# Patient Record
Sex: FEMALE | Employment: FULL TIME | URBAN - METROPOLITAN AREA
[De-identification: names, ages, dates, MRNs, and addresses within clinical notes are randomized per-mention and may not be internally consistent; named-entity substitution may affect disease eponyms.]

---

## 2023-12-04 ENCOUNTER — TELEPHONE (OUTPATIENT)
Dept: PRIMARY CARE | Facility: CLINIC | Age: 53
End: 2023-12-04

## 2023-12-04 ENCOUNTER — PHARMACY VISIT (OUTPATIENT)
Dept: PHARMACY | Facility: CLINIC | Age: 53
End: 2023-12-04
Payer: COMMERCIAL

## 2023-12-04 ENCOUNTER — TELEPHONE (OUTPATIENT)
Dept: PRIMARY CARE | Facility: CLINIC | Age: 53
End: 2023-12-04
Payer: COMMERCIAL

## 2023-12-04 DIAGNOSIS — N30.00 ACUTE CYSTITIS WITHOUT HEMATURIA: Primary | ICD-10-CM

## 2023-12-04 PROCEDURE — RXMED WILLOW AMBULATORY MEDICATION CHARGE

## 2023-12-04 RX ORDER — SULFAMETHOXAZOLE AND TRIMETHOPRIM 800; 160 MG/1; MG/1
1 TABLET ORAL 2 TIMES DAILY
Qty: 28 TABLET | Refills: 0 | Status: SHIPPED | OUTPATIENT
Start: 2023-12-04 | End: 2023-12-18

## 2024-01-12 ENCOUNTER — PHARMACY VISIT (OUTPATIENT)
Dept: PHARMACY | Facility: CLINIC | Age: 54
End: 2024-01-12
Payer: COMMERCIAL

## 2024-01-12 ENCOUNTER — TELEPHONE (OUTPATIENT)
Dept: PRIMARY CARE | Facility: CLINIC | Age: 54
End: 2024-01-12

## 2024-01-12 DIAGNOSIS — J40 BRONCHITIS: Primary | ICD-10-CM

## 2024-01-12 PROCEDURE — RXMED WILLOW AMBULATORY MEDICATION CHARGE

## 2024-01-12 PROCEDURE — RXOTC WILLOW AMBULATORY OTC CHARGE

## 2024-01-12 RX ORDER — AZITHROMYCIN 250 MG/1
TABLET, FILM COATED ORAL
Qty: 6 TABLET | Refills: 0 | Status: SHIPPED | OUTPATIENT
Start: 2024-01-12 | End: 2024-01-17

## 2024-02-19 ENCOUNTER — PHARMACY VISIT (OUTPATIENT)
Dept: PHARMACY | Facility: CLINIC | Age: 54
End: 2024-02-19
Payer: COMMERCIAL

## 2024-02-19 ENCOUNTER — TELEPHONE (OUTPATIENT)
Dept: PRIMARY CARE | Facility: CLINIC | Age: 54
End: 2024-02-19

## 2024-02-19 DIAGNOSIS — J40 BRONCHITIS: Primary | ICD-10-CM

## 2024-02-19 PROCEDURE — RXOTC WILLOW AMBULATORY OTC CHARGE

## 2024-02-19 PROCEDURE — RXMED WILLOW AMBULATORY MEDICATION CHARGE

## 2024-02-19 RX ORDER — AZITHROMYCIN 250 MG/1
TABLET, FILM COATED ORAL
Qty: 6 TABLET | Refills: 0 | Status: SHIPPED | OUTPATIENT
Start: 2024-02-19 | End: 2024-02-24

## 2024-02-27 ENCOUNTER — TELEPHONE (OUTPATIENT)
Dept: PRIMARY CARE | Facility: CLINIC | Age: 54
End: 2024-02-27
Payer: COMMERCIAL

## 2024-02-27 DIAGNOSIS — Z12.31 ENCOUNTER FOR SCREENING MAMMOGRAM FOR MALIGNANT NEOPLASM OF BREAST: ICD-10-CM

## 2024-03-06 ENCOUNTER — HOSPITAL ENCOUNTER (OUTPATIENT)
Dept: RADIOLOGY | Facility: HOSPITAL | Age: 54
Discharge: HOME | End: 2024-03-06
Payer: COMMERCIAL

## 2024-03-06 VITALS — BODY MASS INDEX: 21.66 KG/M2 | HEIGHT: 67 IN | WEIGHT: 138 LBS

## 2024-03-06 DIAGNOSIS — Z12.31 ENCOUNTER FOR SCREENING MAMMOGRAM FOR MALIGNANT NEOPLASM OF BREAST: ICD-10-CM

## 2024-03-06 PROCEDURE — 77063 BREAST TOMOSYNTHESIS BI: CPT | Mod: BILATERAL PROCEDURE | Performed by: RADIOLOGY

## 2024-03-06 PROCEDURE — 77067 SCR MAMMO BI INCL CAD: CPT

## 2024-03-06 PROCEDURE — 77067 SCR MAMMO BI INCL CAD: CPT | Mod: BILATERAL PROCEDURE | Performed by: RADIOLOGY

## 2024-03-18 ENCOUNTER — HOSPITAL ENCOUNTER (OUTPATIENT)
Dept: RADIOLOGY | Facility: HOSPITAL | Age: 54
Discharge: HOME | End: 2024-03-18
Payer: COMMERCIAL

## 2024-03-18 DIAGNOSIS — R92.8 OTHER ABNORMAL AND INCONCLUSIVE FINDINGS ON DIAGNOSTIC IMAGING OF BREAST: ICD-10-CM

## 2024-03-18 PROCEDURE — 76642 ULTRASOUND BREAST LIMITED: CPT | Mod: LT

## 2024-03-18 PROCEDURE — 76982 USE 1ST TARGET LESION: CPT | Mod: LT

## 2024-03-18 PROCEDURE — 77061 BREAST TOMOSYNTHESIS UNI: CPT | Mod: LT

## 2024-03-21 DIAGNOSIS — B00.1 RECURRENT COLD SORES: ICD-10-CM

## 2024-03-21 RX ORDER — VALACYCLOVIR HYDROCHLORIDE 1 G/1
1000 TABLET, FILM COATED ORAL EVERY 24 HOURS
COMMUNITY
Start: 2017-12-04 | End: 2024-03-21 | Stop reason: SDUPTHER

## 2024-03-22 ENCOUNTER — PHARMACY VISIT (OUTPATIENT)
Dept: PHARMACY | Facility: CLINIC | Age: 54
End: 2024-03-22
Payer: COMMERCIAL

## 2024-03-22 PROCEDURE — RXMED WILLOW AMBULATORY MEDICATION CHARGE

## 2024-03-22 RX ORDER — VALACYCLOVIR HYDROCHLORIDE 1 G/1
1000 TABLET, FILM COATED ORAL EVERY 24 HOURS
Qty: 90 TABLET | Refills: 2 | Status: SHIPPED | OUTPATIENT
Start: 2024-03-22

## 2024-04-12 ENCOUNTER — CLINICAL SUPPORT (OUTPATIENT)
Dept: PRIMARY CARE | Facility: CLINIC | Age: 54
End: 2024-04-12
Payer: COMMERCIAL

## 2024-04-12 DIAGNOSIS — Z23 ENCOUNTER FOR IMMUNIZATION: ICD-10-CM

## 2024-04-12 PROCEDURE — 90750 HZV VACC RECOMBINANT IM: CPT | Performed by: FAMILY MEDICINE

## 2024-05-07 ENCOUNTER — PHARMACY VISIT (OUTPATIENT)
Dept: PHARMACY | Facility: CLINIC | Age: 54
End: 2024-05-07
Payer: COMMERCIAL

## 2024-05-07 DIAGNOSIS — B36.9 FUNGAL INFECTION OF SKIN: ICD-10-CM

## 2024-05-07 PROCEDURE — RXMED WILLOW AMBULATORY MEDICATION CHARGE

## 2024-05-07 RX ORDER — KETOCONAZOLE 20 MG/G
CREAM TOPICAL 2 TIMES DAILY
Qty: 60 G | Refills: 1 | Status: SHIPPED | OUTPATIENT
Start: 2024-05-07

## 2024-05-07 RX ORDER — KETOCONAZOLE 20 MG/G
CREAM TOPICAL DAILY
COMMUNITY
End: 2024-05-07 | Stop reason: SDUPTHER

## 2024-05-13 ENCOUNTER — PHARMACY VISIT (OUTPATIENT)
Dept: PHARMACY | Facility: CLINIC | Age: 54
End: 2024-05-13
Payer: COMMERCIAL

## 2024-05-13 DIAGNOSIS — B35.4 TINEA CORPORIS: ICD-10-CM

## 2024-05-13 PROCEDURE — RXMED WILLOW AMBULATORY MEDICATION CHARGE

## 2024-05-13 RX ORDER — FLUCONAZOLE 150 MG/1
150 TABLET ORAL DAILY
COMMUNITY
Start: 2024-05-13 | End: 2024-05-13 | Stop reason: SDUPTHER

## 2024-05-13 RX ORDER — FLUCONAZOLE 150 MG/1
150 TABLET ORAL DAILY
Qty: 7 TABLET | Refills: 0 | Status: SHIPPED | OUTPATIENT
Start: 2024-05-13 | End: 2024-05-20

## 2024-05-17 ENCOUNTER — PHARMACY VISIT (OUTPATIENT)
Dept: PHARMACY | Facility: CLINIC | Age: 54
End: 2024-05-17
Payer: COMMERCIAL

## 2024-05-17 PROCEDURE — RXMED WILLOW AMBULATORY MEDICATION CHARGE

## 2024-05-17 RX ORDER — TRIAMCINOLONE ACETONIDE 1 MG/G
OINTMENT TOPICAL
Qty: 80 G | Refills: 1 | OUTPATIENT
Start: 2024-05-17

## 2024-07-19 ENCOUNTER — TELEPHONE (OUTPATIENT)
Dept: PRIMARY CARE | Facility: CLINIC | Age: 54
End: 2024-07-19
Payer: COMMERCIAL

## 2024-07-19 ENCOUNTER — PHARMACY VISIT (OUTPATIENT)
Dept: PHARMACY | Facility: CLINIC | Age: 54
End: 2024-07-19
Payer: COMMERCIAL

## 2024-07-19 DIAGNOSIS — N30.00 ACUTE CYSTITIS WITHOUT HEMATURIA: Primary | ICD-10-CM

## 2024-07-19 PROCEDURE — RXMED WILLOW AMBULATORY MEDICATION CHARGE

## 2024-07-19 RX ORDER — SULFAMETHOXAZOLE AND TRIMETHOPRIM 800; 160 MG/1; MG/1
1 TABLET ORAL 2 TIMES DAILY
Qty: 28 TABLET | Refills: 0 | Status: SHIPPED | OUTPATIENT
Start: 2024-07-19 | End: 2024-08-02

## 2024-08-09 ENCOUNTER — PHARMACY VISIT (OUTPATIENT)
Dept: PHARMACY | Facility: CLINIC | Age: 54
End: 2024-08-09
Payer: COMMERCIAL

## 2024-08-09 DIAGNOSIS — M70.51 SUPRAPATELLAR BURSITIS OF RIGHT KNEE: Primary | ICD-10-CM

## 2024-08-09 PROCEDURE — RXMED WILLOW AMBULATORY MEDICATION CHARGE

## 2024-08-09 RX ORDER — METHYLPREDNISOLONE 4 MG/1
TABLET ORAL
Qty: 21 TABLET | Refills: 0 | Status: SHIPPED | OUTPATIENT
Start: 2024-08-09

## 2024-08-28 ENCOUNTER — CLINICAL SUPPORT (OUTPATIENT)
Dept: PRIMARY CARE | Facility: CLINIC | Age: 54
End: 2024-08-28
Payer: COMMERCIAL

## 2024-08-28 DIAGNOSIS — Z00.00 ROUTINE MEDICAL EXAM: ICD-10-CM

## 2024-08-28 DIAGNOSIS — I95.89 OTHER SPECIFIED HYPOTENSION: ICD-10-CM

## 2024-08-28 DIAGNOSIS — R42 DIZZY: ICD-10-CM

## 2024-08-28 LAB
ALBUMIN SERPL-MCNC: 4.7 G/DL (ref 3.5–5)
ALP BLD-CCNC: 99 U/L (ref 35–125)
ALT SERPL-CCNC: 39 U/L (ref 5–40)
ANION GAP SERPL CALC-SCNC: 12 MMOL/L
AST SERPL-CCNC: 41 U/L (ref 5–40)
BASOPHILS # BLD AUTO: 0.03 X10*3/UL (ref 0–0.1)
BASOPHILS NFR BLD AUTO: 0.5 %
BILIRUB SERPL-MCNC: 0.8 MG/DL (ref 0.1–1.2)
BUN SERPL-MCNC: 5 MG/DL (ref 8–25)
CALCIUM SERPL-MCNC: 9.5 MG/DL (ref 8.5–10.4)
CHLORIDE SERPL-SCNC: 105 MMOL/L (ref 97–107)
CHOLEST SERPL-MCNC: 195 MG/DL (ref 133–200)
CHOLEST/HDLC SERPL: 1.9 {RATIO}
CO2 SERPL-SCNC: 26 MMOL/L (ref 24–31)
CREAT SERPL-MCNC: 0.5 MG/DL (ref 0.4–1.6)
EGFRCR SERPLBLD CKD-EPI 2021: >90 ML/MIN/1.73M*2
EOSINOPHIL # BLD AUTO: 0.24 X10*3/UL (ref 0–0.7)
EOSINOPHIL NFR BLD AUTO: 3.8 %
ERYTHROCYTE [DISTWIDTH] IN BLOOD BY AUTOMATED COUNT: 13.2 % (ref 11.5–14.5)
GLUCOSE SERPL-MCNC: 85 MG/DL (ref 65–99)
HCT VFR BLD AUTO: 43.1 % (ref 36–46)
HDLC SERPL-MCNC: 102 MG/DL
HGB BLD-MCNC: 14.4 G/DL (ref 12–16)
IMM GRANULOCYTES # BLD AUTO: 0.03 X10*3/UL (ref 0–0.7)
IMM GRANULOCYTES NFR BLD AUTO: 0.5 % (ref 0–0.9)
LDLC SERPL CALC-MCNC: 69 MG/DL (ref 65–130)
LYMPHOCYTES # BLD AUTO: 2.46 X10*3/UL (ref 1.2–4.8)
LYMPHOCYTES NFR BLD AUTO: 38.7 %
MAGNESIUM SERPL-MCNC: 2.1 MG/DL (ref 1.6–3.1)
MCH RBC QN AUTO: 32.3 PG (ref 26–34)
MCHC RBC AUTO-ENTMCNC: 33.4 G/DL (ref 32–36)
MCV RBC AUTO: 97 FL (ref 80–100)
MONOCYTES # BLD AUTO: 0.52 X10*3/UL (ref 0.1–1)
MONOCYTES NFR BLD AUTO: 8.2 %
NEUTROPHILS # BLD AUTO: 3.07 X10*3/UL (ref 1.2–7.7)
NEUTROPHILS NFR BLD AUTO: 48.3 %
NRBC BLD-RTO: 0 /100 WBCS (ref 0–0)
PLATELET # BLD AUTO: 282 X10*3/UL (ref 150–450)
POTASSIUM SERPL-SCNC: 4.5 MMOL/L (ref 3.4–5.1)
PROT SERPL-MCNC: 7.2 G/DL (ref 5.9–7.9)
RBC # BLD AUTO: 4.46 X10*6/UL (ref 4–5.2)
SODIUM SERPL-SCNC: 143 MMOL/L (ref 133–145)
TRIGL SERPL-MCNC: 119 MG/DL (ref 40–150)
TSH SERPL DL<=0.05 MIU/L-ACNC: 1.32 MIU/L (ref 0.27–4.2)
WBC # BLD AUTO: 6.4 X10*3/UL (ref 4.4–11.3)

## 2024-08-28 PROCEDURE — 84443 ASSAY THYROID STIM HORMONE: CPT

## 2024-08-28 PROCEDURE — 85025 COMPLETE CBC W/AUTO DIFF WBC: CPT

## 2024-08-28 PROCEDURE — 80053 COMPREHEN METABOLIC PANEL: CPT

## 2024-08-28 PROCEDURE — 83735 ASSAY OF MAGNESIUM: CPT

## 2024-08-28 PROCEDURE — 80061 LIPID PANEL: CPT

## 2024-08-28 PROCEDURE — 36415 COLL VENOUS BLD VENIPUNCTURE: CPT

## 2024-08-30 ENCOUNTER — OFFICE VISIT (OUTPATIENT)
Dept: PRIMARY CARE | Facility: CLINIC | Age: 54
End: 2024-08-30
Payer: COMMERCIAL

## 2024-08-30 VITALS
HEIGHT: 67 IN | DIASTOLIC BLOOD PRESSURE: 60 MMHG | BODY MASS INDEX: 21.35 KG/M2 | RESPIRATION RATE: 20 BRPM | WEIGHT: 136 LBS | HEART RATE: 83 BPM | OXYGEN SATURATION: 100 % | SYSTOLIC BLOOD PRESSURE: 108 MMHG

## 2024-08-30 DIAGNOSIS — Z12.31 ENCOUNTER FOR SCREENING MAMMOGRAM FOR MALIGNANT NEOPLASM OF BREAST: ICD-10-CM

## 2024-08-30 DIAGNOSIS — R42 DIZZY: ICD-10-CM

## 2024-08-30 DIAGNOSIS — Z00.00 ROUTINE MEDICAL EXAM: ICD-10-CM

## 2024-08-30 DIAGNOSIS — I95.89 OTHER SPECIFIED HYPOTENSION: ICD-10-CM

## 2024-08-30 DIAGNOSIS — R92.8 ABNORMAL MAMMOGRAM OF LEFT BREAST: ICD-10-CM

## 2024-08-30 PROCEDURE — 99396 PREV VISIT EST AGE 40-64: CPT | Performed by: NURSE PRACTITIONER

## 2024-08-30 PROCEDURE — 3008F BODY MASS INDEX DOCD: CPT | Performed by: NURSE PRACTITIONER

## 2024-08-30 ASSESSMENT — ENCOUNTER SYMPTOMS
LIGHT-HEADEDNESS: 1
CONSTITUTIONAL NEGATIVE: 1
GASTROINTESTINAL NEGATIVE: 1
MUSCULOSKELETAL NEGATIVE: 1
CARDIOVASCULAR NEGATIVE: 1
RESPIRATORY NEGATIVE: 1
DIZZINESS: 1

## 2024-08-30 ASSESSMENT — PAIN SCALES - GENERAL: PAINLEVEL: 0-NO PAIN

## 2024-08-30 NOTE — PROGRESS NOTES
"Subjective   Patient ID: Jana Galloway is a 54 y.o. female who presents for Annual Exam (Pt had lab work done 2 days ago. ).    HPI Having dizziness and noted low BP, was drinking 3 diet coke daily at work, has been drinking more water.     Review of Systems   Constitutional: Negative.    HENT: Negative.     Respiratory: Negative.     Cardiovascular: Negative.    Gastrointestinal: Negative.    Genitourinary: Negative.    Musculoskeletal: Negative.    Neurological:  Positive for dizziness and light-headedness.       Objective   /60   Pulse 83   Resp 20   Ht 1.702 m (5' 7\")   Wt 61.7 kg (136 lb)   SpO2 100%   BMI 21.30 kg/m²     Physical Exam  Vitals and nursing note reviewed.   Constitutional:       General: She is not in acute distress.  HENT:      Right Ear: Tympanic membrane and ear canal normal.      Left Ear: Tympanic membrane and ear canal normal.      Nose: Nose normal. No rhinorrhea.      Mouth/Throat:      Pharynx: Oropharynx is clear. No oropharyngeal exudate or posterior oropharyngeal erythema.      Comments: Dentition wnl  Eyes:      Extraocular Movements: Extraocular movements intact.      Conjunctiva/sclera: Conjunctivae normal.      Pupils: Pupils are equal, round, and reactive to light.   Neck:      Vascular: No carotid bruit.   Cardiovascular:      Rate and Rhythm: Normal rate and regular rhythm.      Heart sounds: Normal heart sounds. No murmur heard.  Pulmonary:      Breath sounds: Normal breath sounds. No wheezing or rhonchi.   Abdominal:      General: Bowel sounds are normal. There is no distension.      Palpations: Abdomen is soft. There is no mass.      Tenderness: There is no abdominal tenderness. There is no guarding or rebound.      Hernia: No hernia is present.   Musculoskeletal:         General: No swelling or tenderness. Normal range of motion.      Cervical back: Normal range of motion and neck supple.   Lymphadenopathy:      Cervical: No cervical adenopathy.   Skin:    "  General: Skin is warm.      Findings: No rash.   Neurological:      General: No focal deficit present.      Mental Status: She is alert.         Assessment/Plan

## 2024-09-05 ENCOUNTER — TELEPHONE (OUTPATIENT)
Dept: PRIMARY CARE | Facility: CLINIC | Age: 54
End: 2024-09-05
Payer: COMMERCIAL

## 2024-09-05 ENCOUNTER — PHARMACY VISIT (OUTPATIENT)
Dept: PHARMACY | Facility: CLINIC | Age: 54
End: 2024-09-05
Payer: COMMERCIAL

## 2024-09-05 DIAGNOSIS — J01.00 ACUTE NON-RECURRENT MAXILLARY SINUSITIS: Primary | ICD-10-CM

## 2024-09-05 PROCEDURE — RXOTC WILLOW AMBULATORY OTC CHARGE

## 2024-09-05 PROCEDURE — RXMED WILLOW AMBULATORY MEDICATION CHARGE

## 2024-09-05 RX ORDER — AZITHROMYCIN 250 MG/1
TABLET, FILM COATED ORAL
Qty: 6 TABLET | Refills: 0 | Status: SHIPPED | OUTPATIENT
Start: 2024-09-05 | End: 2024-09-10

## 2024-09-30 ENCOUNTER — HOSPITAL ENCOUNTER (OUTPATIENT)
Dept: RADIOLOGY | Facility: CLINIC | Age: 54
Discharge: HOME | End: 2024-09-30
Payer: COMMERCIAL

## 2024-09-30 ENCOUNTER — PHARMACY VISIT (OUTPATIENT)
Dept: PHARMACY | Facility: CLINIC | Age: 54
End: 2024-09-30
Payer: COMMERCIAL

## 2024-09-30 ENCOUNTER — APPOINTMENT (OUTPATIENT)
Dept: PRIMARY CARE | Facility: CLINIC | Age: 54
End: 2024-09-30
Payer: COMMERCIAL

## 2024-09-30 DIAGNOSIS — M54.16 LUMBAR RADICULOPATHY: ICD-10-CM

## 2024-09-30 DIAGNOSIS — M54.16 LUMBAR RADICULOPATHY: Primary | ICD-10-CM

## 2024-09-30 PROCEDURE — 72110 X-RAY EXAM L-2 SPINE 4/>VWS: CPT | Performed by: RADIOLOGY

## 2024-09-30 PROCEDURE — RXMED WILLOW AMBULATORY MEDICATION CHARGE

## 2024-09-30 PROCEDURE — 72110 X-RAY EXAM L-2 SPINE 4/>VWS: CPT

## 2024-09-30 RX ORDER — PREDNISONE 20 MG/1
TABLET ORAL
Qty: 20 TABLET | Refills: 0 | Status: SHIPPED | OUTPATIENT
Start: 2024-09-30 | End: 2024-10-13

## 2024-09-30 RX ORDER — GABAPENTIN 300 MG/1
300 CAPSULE ORAL NIGHTLY
Qty: 30 CAPSULE | Refills: 1 | Status: SHIPPED | OUTPATIENT
Start: 2024-09-30 | End: 2025-03-29

## 2024-10-04 ENCOUNTER — HOSPITAL ENCOUNTER (OUTPATIENT)
Dept: RADIOLOGY | Facility: HOSPITAL | Age: 54
Discharge: HOME | End: 2024-10-04
Payer: COMMERCIAL

## 2024-10-04 VITALS — BODY MASS INDEX: 21.03 KG/M2 | HEIGHT: 67 IN | WEIGHT: 134 LBS

## 2024-10-04 DIAGNOSIS — R92.8 ABNORMAL MAMMOGRAM OF LEFT BREAST: ICD-10-CM

## 2024-10-04 PROCEDURE — 77061 BREAST TOMOSYNTHESIS UNI: CPT | Mod: LT

## 2024-10-04 PROCEDURE — 76642 ULTRASOUND BREAST LIMITED: CPT | Mod: LT

## 2024-10-07 ENCOUNTER — TELEPHONE (OUTPATIENT)
Dept: PRIMARY CARE | Facility: CLINIC | Age: 54
End: 2024-10-07
Payer: COMMERCIAL

## 2024-10-07 DIAGNOSIS — M51.16 LUMBAR DISC DISEASE WITH RADICULOPATHY: Primary | ICD-10-CM

## 2024-10-07 NOTE — PROGRESS NOTES
Subjective   Patient ID: Jana Galloway is a 54 y.o. female who presents for No chief complaint on file..    HPI pt with ongoing severe back pain with radiation down her right leg to her ankle.  No bowel or bladder dysfunction.  Pain keeping her up at night.  She has tried rest/otc meds and 2 rounds of prednisone without relief.  Has tried TENS PT also and had XR lumbar spine.      Review of Systemssee HPI    Objective   There were no vitals taken for this visit.    Physical Exam some right paralumbar pain to palpation L2-5 and LS junction.  FB to 70 degrees.  Positive radicular pain down right posterior leg to ankle.  .  Good rom of hip.      Assessment/Plan   Problem List Items Addressed This Visit    None  Visit Diagnoses         Codes    Lumbar disc disease with radiculopathy    -  Primary M51.16        Set up MR for eval RO disc herniation.

## 2024-10-18 DIAGNOSIS — M54.16 LUMBAR RADICULOPATHY: Primary | ICD-10-CM

## 2024-10-18 PROCEDURE — RXMED WILLOW AMBULATORY MEDICATION CHARGE

## 2024-10-18 RX ORDER — TRAMADOL HYDROCHLORIDE 50 MG/1
50 TABLET ORAL EVERY 6 HOURS PRN
Qty: 28 TABLET | Refills: 0 | Status: SHIPPED | OUTPATIENT
Start: 2024-10-18 | End: 2024-10-26

## 2024-10-19 ENCOUNTER — PHARMACY VISIT (OUTPATIENT)
Dept: PHARMACY | Facility: CLINIC | Age: 54
End: 2024-10-19
Payer: COMMERCIAL

## 2024-10-21 ENCOUNTER — HOSPITAL ENCOUNTER (OUTPATIENT)
Dept: RADIOLOGY | Facility: HOSPITAL | Age: 54
End: 2024-10-21
Payer: COMMERCIAL

## 2024-10-22 DIAGNOSIS — M54.9 BACK PAIN, UNSPECIFIED BACK LOCATION, UNSPECIFIED BACK PAIN LATERALITY, UNSPECIFIED CHRONICITY: ICD-10-CM

## 2024-10-22 RX ORDER — GABAPENTIN 300 MG/1
300 CAPSULE ORAL 2 TIMES DAILY
Qty: 60 CAPSULE | Refills: 1 | Status: SHIPPED | OUTPATIENT
Start: 2024-10-22

## 2024-10-22 RX ORDER — GABAPENTIN 300 MG/1
300 CAPSULE ORAL 2 TIMES DAILY
COMMUNITY
Start: 2024-09-30 | End: 2024-10-22 | Stop reason: SDUPTHER

## 2024-10-23 ENCOUNTER — PHARMACY VISIT (OUTPATIENT)
Dept: PHARMACY | Facility: CLINIC | Age: 54
End: 2024-10-23
Payer: COMMERCIAL

## 2024-10-23 PROCEDURE — RXMED WILLOW AMBULATORY MEDICATION CHARGE

## 2024-10-23 RX ORDER — HYDROCODONE BITARTRATE AND ACETAMINOPHEN 5; 325 MG/1; MG/1
TABLET ORAL
Qty: 20 TABLET | Refills: 0 | OUTPATIENT
Start: 2024-10-23

## 2024-10-24 ENCOUNTER — PHARMACY VISIT (OUTPATIENT)
Dept: PHARMACY | Facility: CLINIC | Age: 54
End: 2024-10-24
Payer: COMMERCIAL

## 2024-10-24 PROCEDURE — RXMED WILLOW AMBULATORY MEDICATION CHARGE

## 2024-10-28 ENCOUNTER — TELEPHONE (OUTPATIENT)
Dept: PRIMARY CARE | Facility: CLINIC | Age: 54
End: 2024-10-28
Payer: COMMERCIAL

## 2024-10-28 ENCOUNTER — PHARMACY VISIT (OUTPATIENT)
Dept: PHARMACY | Facility: CLINIC | Age: 54
End: 2024-10-28
Payer: COMMERCIAL

## 2024-10-28 DIAGNOSIS — L73.9 FOLLICULITIS: Primary | ICD-10-CM

## 2024-10-28 PROCEDURE — RXMED WILLOW AMBULATORY MEDICATION CHARGE

## 2024-10-28 PROCEDURE — RXOTC WILLOW AMBULATORY OTC CHARGE

## 2024-10-28 RX ORDER — SULFAMETHOXAZOLE AND TRIMETHOPRIM 800; 160 MG/1; MG/1
1 TABLET ORAL 2 TIMES DAILY
Qty: 28 TABLET | Refills: 0 | Status: SHIPPED | OUTPATIENT
Start: 2024-10-28 | End: 2024-11-11

## 2024-10-29 ENCOUNTER — EVALUATION (OUTPATIENT)
Dept: PHYSICAL THERAPY | Facility: CLINIC | Age: 54
End: 2024-10-29
Payer: COMMERCIAL

## 2024-10-29 DIAGNOSIS — M54.16 RADICULOPATHY, LUMBAR REGION: ICD-10-CM

## 2024-10-29 DIAGNOSIS — M51.26 OTHER INTERVERTEBRAL DISC DISPLACEMENT, LUMBAR REGION: ICD-10-CM

## 2024-10-29 PROCEDURE — 97161 PT EVAL LOW COMPLEX 20 MIN: CPT | Mod: GP | Performed by: PHYSICAL THERAPIST

## 2024-10-29 ASSESSMENT — ENCOUNTER SYMPTOMS
QUALITY: DULL ACHE
QUALITY: RADIATING
PAIN SCALE: 4
PAIN SCALE AT HIGHEST: 8
PAIN SCALE AT LOWEST: 3

## 2024-10-29 ASSESSMENT — ACTIVITIES OF DAILY LIVING (ADL): POOR_BALANCE: 1

## 2024-11-04 ENCOUNTER — PHARMACY VISIT (OUTPATIENT)
Dept: PHARMACY | Facility: CLINIC | Age: 54
End: 2024-11-04
Payer: COMMERCIAL

## 2024-11-04 ENCOUNTER — TELEPHONE (OUTPATIENT)
Dept: PRIMARY CARE | Facility: CLINIC | Age: 54
End: 2024-11-04
Payer: COMMERCIAL

## 2024-11-04 DIAGNOSIS — K04.7 DENTAL INFECTION: Primary | ICD-10-CM

## 2024-11-04 PROCEDURE — RXMED WILLOW AMBULATORY MEDICATION CHARGE

## 2024-11-04 RX ORDER — AMOXICILLIN 500 MG/1
1000 CAPSULE ORAL EVERY 12 HOURS SCHEDULED
Qty: 40 CAPSULE | Refills: 0 | Status: SHIPPED | OUTPATIENT
Start: 2024-11-04 | End: 2024-11-14

## 2024-11-05 ENCOUNTER — TELEPHONE (OUTPATIENT)
Dept: ORTHOPEDIC SURGERY | Facility: CLINIC | Age: 54
End: 2024-11-05

## 2024-11-06 ENCOUNTER — TELEPHONE (OUTPATIENT)
Dept: ORTHOPEDIC SURGERY | Facility: CLINIC | Age: 54
End: 2024-11-06

## 2024-11-06 ENCOUNTER — PHARMACY VISIT (OUTPATIENT)
Dept: PHARMACY | Facility: CLINIC | Age: 54
End: 2024-11-06
Payer: COMMERCIAL

## 2024-11-06 DIAGNOSIS — M51.26 HNP (HERNIATED NUCLEUS PULPOSUS), LUMBAR: ICD-10-CM

## 2024-11-06 PROCEDURE — RXMED WILLOW AMBULATORY MEDICATION CHARGE

## 2024-11-06 PROCEDURE — RXOTC WILLOW AMBULATORY OTC CHARGE

## 2024-11-06 RX ORDER — HYDROCODONE BITARTRATE AND ACETAMINOPHEN 5; 325 MG/1; MG/1
1 TABLET ORAL EVERY 4 HOURS PRN
Qty: 20 TABLET | Refills: 0 | Status: SHIPPED | OUTPATIENT
Start: 2024-11-06

## 2024-11-06 NOTE — PROGRESS NOTES
This patient has 2 records. The message was linked to the one labeled DO NOT USE. I refilled her Norco

## 2024-11-06 NOTE — TELEPHONE ENCOUNTER
11/6/24  PT called today asking if her Rx refill was completed because she called yesterday requesting her prescription. PT states she ran out of her Oxycodone yesterday and needs them. She does not know what mg they are or how many she gets in each bottle. She uses the pharmacy at [facility].

## 2024-11-08 ENCOUNTER — APPOINTMENT (OUTPATIENT)
Dept: PHYSICAL THERAPY | Facility: CLINIC | Age: 54
End: 2024-11-08
Payer: COMMERCIAL

## 2024-11-08 ENCOUNTER — TREATMENT (OUTPATIENT)
Dept: PHYSICAL THERAPY | Facility: CLINIC | Age: 54
End: 2024-11-08
Payer: COMMERCIAL

## 2024-11-08 DIAGNOSIS — M54.16 RADICULOPATHY, LUMBAR REGION: ICD-10-CM

## 2024-11-08 DIAGNOSIS — M51.26 OTHER INTERVERTEBRAL DISC DISPLACEMENT, LUMBAR REGION: Primary | ICD-10-CM

## 2024-11-08 PROCEDURE — 97110 THERAPEUTIC EXERCISES: CPT | Mod: GP

## 2024-11-08 NOTE — PROGRESS NOTES
Physical Therapy Treatment    Patient Name: Jana Galloway  MRN: [id number]  Encounter date: 11/8/2024    Time Calculation  Start Time: 1407  Stop Time: 1450  Time Calculation (min): 43 min  PT Therapeutic Procedures Time Entry  Therapeutic Exercise Time Entry: 43    Visit # 1 of 20 (Not including eval)  Visits/Dates Authorized:  [facility] - AUTH REQ / $3500 DEDUCT MET / $5100 OOP not met / 90% COVERAGE / 30V pt/ot MAX    Current Problem:   Problem List Items Addressed This Visit             ICD-10-CM    Other intervertebral disc displacement, lumbar region - Primary M51.26    Relevant Orders    Follow Up In Physical Therapy    Radiculopathy, lumbar region M54.16    Relevant Orders    Follow Up In Physical Therapy     Precautions:    Per recent MRI - R lumbar disc extrusion with RLE radicular pain/paresthesias       Subjective   General:    Patient reports symptoms and radicular pain/paresthesias seem to be getting worse since eval. She's been performing prone lying and propping on elbows, but not any other specific exercises. Difficulty getting through a full day at work, oftentimes leaning and holding herself up on desk/walls. Using ice, heat, TENS to help control symptoms. Difficulty finding comfortable position to sleep at night.    Pre-Treatment Symptoms:   Pain Assessment: 0-10  0-10 (Numeric) Pain Score: 7    Objective   Findings:   L lateral shift noted in standing, asymmetric weight bearing L>R     From eval on 10/29/24 - Patient presents with R LB and R LE radicular sx. She presents with MRI + for disc extrusion. Subjective and objective findings are consistant with MRI and prt responds to Prone lying as expected in same consistent manner.. Key impairments include: decreased ROM and strength, poor balance and compensatory gait patterning, increased swelling, and pain with functional activities such as sitting, driving and bending.           Treatments:      Therapeutic Exercise 57884:   - Perform all  "exercises in pain-free range, progressing as tolerated    HL LTR (gentle)  Supine SKTC, piriformis stretch, figure 4 stretching 20\" holds R/L  Supine 90/90 active HS stretch and nerve glide x10 R/L  HL glute sets 10x5\"  HL TA + BKFO x10 R/L  HL PPT (pain-free range)  DL bridge x10  Prone knee flexion x10 R/L  Prone hip IR/ER AROM with TA x10 R/L  Quadruped sit back to press up (gentle with this one, don't force flexion)  Lateral shift correction at wall (L elbow on wall, pushing pelvis to L) x10  Standing lumbar extension - with and without sustained R sidebend x10  HEP    Previous and reviewed:  Educated in Low back /disc protection  No bending or lifting,  Sit with a roll and improve rising  Monitor your symptoms  Prone on elbows 10 minutes ever 2 hours.      Neuromuscular Re-Ed 19692:      Therapeutic Activity 92131:     Gait Training 69487:      Manual Therapy 34699:      Modalities:          HEP / Access Codes:   Access Code: [email or web address] - Date: 11/08/2024  - Supine Single Knee to Chest Stretch  - 1 x daily - 2 sets - 20 seconds hold  - Supine Piriformis Stretch with Leg Straight  - 1 x daily - 2 sets - 20 seconds hold  - Supine Figure 4 Piriformis Stretch  - 1 x daily - 2 sets - 20 seconds hold  - Supine Hamstring Stretch  - 1 x daily - 15 reps  - Hooklying Gluteal Sets  - 1 x daily - 15 reps - 5 seconds hold  - Bent Knee Fallouts with Alternating Legs  - 1 x daily - 10 reps  - Supine Posterior Pelvic Tilt  - 1 x daily - 15 reps - 5 seconds hold  - Supine Bridge  - 1 x daily - 2 sets - 10 reps  - Prone Knee Flexion AROM  - 1 x daily - 10-20 reps  - Prone Hip Internal and External Rotation AROM  - 1 x daily - 10-20 reps  - Quadruped Rock Back into Prone Press Up  - 1 x daily - 10 reps  - Lateral Shift Correction at Wall  - 1 x daily - 10 reps  - Standing Lumbar Extension  - 1 x daily - 10 reps    Assessment:    Issued clinic and home exercises focusing on improving core activation, spinal/pelvic mobility, LE " flexibility, and reducing symptoms through repeated shift correct and lumbar extension movements. Able to complete all exercises with instruction to remain in pain-free range and progress mobility as tolerated. Instructed to continue modalities at home for pain-modulation while waiting for therapeutic effects of exercise to take hold.    Post-Treatment Symptoms:   Response to Interventions: No worse    Plan:    Planned therapy interventions: abdominal trunk stabilization, neuromuscular re-education, postural training, home exercise program, therapeutic activities and strengthening  Other planned therapy interventions: extension baed program - avoid flexion, but progress to flexion as appropriate for return to normal activities  Frequency: 2x week  Duration in weeks: 10    Goals:   Active       PT Problem       PT Goal 1       Start:  10/30/24    Expected End:  01/27/25       1. Pt will improve Low back disability score to < 10% impairment  2. Pt will be able to work through full day without increased pain or radicular sx  3. Pt will be able to bend to allow her to put on shoes and sock, especially in am without increased pain or radicular sx  4. Pt will demonstrate good core stabilization with bending and lifting, and no increased sx  5. Pt will be independent with HEP

## 2024-11-09 PROBLEM — M51.26 OTHER INTERVERTEBRAL DISC DISPLACEMENT, LUMBAR REGION: Status: ACTIVE | Noted: 2024-11-09

## 2024-11-09 PROBLEM — M54.16 RADICULOPATHY, LUMBAR REGION: Status: ACTIVE | Noted: 2024-11-09

## 2024-11-09 ASSESSMENT — PAIN SCALES - GENERAL: PAINLEVEL_OUTOF10: 7

## 2024-11-09 ASSESSMENT — PAIN - FUNCTIONAL ASSESSMENT: PAIN_FUNCTIONAL_ASSESSMENT: 0-10

## 2024-11-15 ENCOUNTER — APPOINTMENT (OUTPATIENT)
Dept: PHYSICAL THERAPY | Facility: CLINIC | Age: 54
End: 2024-11-15
Payer: COMMERCIAL

## 2024-11-15 NOTE — PROGRESS NOTES
"Physical Therapy Treatment    Patient Name: Jana Galloway  MRN: [id number]  Encounter date: 11/15/2024         Visit # Visit count could not be calculated. Make sure you are using a visit which is associated with an episode. of ***  Visits/Dates Authorized: ***    Current Problem:   Problem List Items Addressed This Visit    None    Surgery:   Surgery date:     Precautions:           Subjective   General:        Pre-Treatment Symptoms:        Objective   Findings:   Vitals:             Treatments:      Therapeutic Exercise 30683:   - Perform all exercises in pain-free range, progressing as tolerated    HL LTR (gentle)  Supine SKTC, piriformis stretch, figure 4 stretching 20\" holds R/L  Supine 90/90 active HS stretch and nerve glide x10 R/L  HL glute sets 10x5\"  HL TA + BKFO x10 R/L  HL PPT (pain-free range)  DL bridge x10  Prone knee flexion x10 R/L  Prone hip IR/ER AROM with TA x10 R/L  Quadruped sit back to press up (gentle with this one, don't force flexion)  Lateral shift correction at wall (L elbow on wall, pushing pelvis to L) x10  Standing lumbar extension - with and without sustained R sidebend x10  HEP    Previous and reviewed:  Educated in Low back /disc protection  No bending or lifting,  Sit with a roll and improve rising  Monitor your symptoms  Prone on elbows 10 minutes ever 2 hours.   Neuromuscular Re-Ed 44056:       Therapeutic Activity 58272:      Gait Training 52009:       Manual Therapy 96096:       Modalities:   {Modalities Used:94500}      HEP / Access Codes:     Assessment:        Post-Treatment Symptoms:        Plan:        Goals:     "

## 2024-11-18 ENCOUNTER — PHARMACY VISIT (OUTPATIENT)
Dept: PHARMACY | Facility: CLINIC | Age: 54
End: 2024-11-18
Payer: COMMERCIAL

## 2024-11-18 PROCEDURE — RXMED WILLOW AMBULATORY MEDICATION CHARGE

## 2024-11-18 PROCEDURE — RXOTC WILLOW AMBULATORY OTC CHARGE

## 2024-11-21 ENCOUNTER — APPOINTMENT (OUTPATIENT)
Dept: PRIMARY CARE | Facility: CLINIC | Age: 54
End: 2024-11-21
Payer: COMMERCIAL

## 2024-11-22 ENCOUNTER — TREATMENT (OUTPATIENT)
Dept: PHYSICAL THERAPY | Facility: CLINIC | Age: 54
End: 2024-11-22
Payer: COMMERCIAL

## 2024-11-22 DIAGNOSIS — M54.16 RADICULOPATHY, LUMBAR REGION: ICD-10-CM

## 2024-11-22 DIAGNOSIS — M51.26 OTHER INTERVERTEBRAL DISC DISPLACEMENT, LUMBAR REGION: ICD-10-CM

## 2024-11-22 PROCEDURE — 97110 THERAPEUTIC EXERCISES: CPT | Mod: GP,CQ

## 2024-11-22 ASSESSMENT — PAIN SCALES - GENERAL: PAINLEVEL_OUTOF10: 8

## 2024-11-22 ASSESSMENT — PAIN - FUNCTIONAL ASSESSMENT: PAIN_FUNCTIONAL_ASSESSMENT: 0-10

## 2024-11-22 NOTE — PROGRESS NOTES
"Physical Therapy Treatment    Patient Name: Jana Galloway  MRN: [id number]  Encounter date: 11/22/2024 PT Received On: 11/22/24  Time Calculation  Start Time: 0900  Stop Time: 0940  Time Calculation (min): 40 min  PT Therapeutic Procedures Time Entry  Therapeutic Exercise Time Entry: 40    Visit # 2 of 10  Visits/Dates Authorized: PT AUTH RECEIVED FOR 20 VISITS FOR 70675, 16510, 79495, 22669, 24795, 98957 FROM 10/29/24 TO 12/31/24 AUTH # [id number]  JS    EVAL ONLY -  [facility] - AUTH REQ / $3500 DEDUCT MET / $5100 OOP not met / 90% COVERAGE / 30V pt/ot MAX / PER CONTIGO w/s / ds 10/28/24.     Current Problem:   Problem List Items Addressed This Visit             ICD-10-CM    Other intervertebral disc displacement, lumbar region M51.26    Radiculopathy, lumbar region M54.16       Precautions: 10llb lifting restriction          Subjective Really hurting, leg pain and sometimes leg pain gives out. Sitting most uncomfortable.  Leg pain and back pain can be equally bad.      Pre-Treatment Symptoms:   Pain Assessment: 0-10  0-10 (Numeric) Pain Score: 8    Objective   Findings:   Vitals:             Treatments:      Therapeutic Exercise 70920:   - Perform all exercises in pain-free range, progressing as tolerated    HL LTR (gentle)  MHP with first three stretches  Supine SKTC, piriformis stretch, figure 4 stretching 20\" holds R/L  Supine 90/90 active HS stretch and nerve glide x10 R/L  HL glute sets 10x5\"  HL TA + BKFO x10 R/L  HL PPT (pain-free range)  DL bridge x10  Prone knee flexion x10 R/L  Prone hip IR/ER AROM with TA x10 R/L  Quadruped sit back to press up (gentle with this one, don't force flexion)  Quadraped LE extension 10x each  Lateral shift correction at wall (L elbow on wall, pushing pelvis to L) x10  Standing lumbar extension - with and without sustained R sidebend x10  Standing retractions and extension for posture strength  Reviewed log roll technique for transfers  HEP    Previous and " reviewed:  Educated in Low back /disc protection  No bending or lifting,  Sit with a roll and improve rising  Monitor your symptoms  Prone on elbows 10 minutes ever 2 hours.          HEP / Access Codes:     Assessment: Sitting too long really increase leg pain. Not sleeping well at all.  But can finally bend over to touch toes. The shifting therex has been very helpful. Reviewed lifting restrictions from MD. Decreased symptoms after session.  Compliant with Hep and lateral shifts seem to help the most.  Challenged with quadraped LE needs tactile cues for core stabilization       Post-Treatment Symptoms:    decreased    Plan:    Progress as able     Goals:   Active       PT Problem       PT Goal 1       Start:  10/30/24    Expected End:  01/27/25       1. Pt will improve Low back disability score to < 10% impairment  2. Pt will be able to work through full day without increased pain or radicular sx  3. Pt will be able to bend to allow her to put on shoes and sock, especially in am without increased pain or radicular sx  4. Pt will demonstrate good core stabilization with bending and lifting, and no increased sx  5. Pt will be independent with HEP

## 2024-11-29 ENCOUNTER — TREATMENT (OUTPATIENT)
Dept: PHYSICAL THERAPY | Facility: CLINIC | Age: 54
End: 2024-11-29
Payer: COMMERCIAL

## 2024-11-29 DIAGNOSIS — M51.26 OTHER INTERVERTEBRAL DISC DISPLACEMENT, LUMBAR REGION: ICD-10-CM

## 2024-11-29 DIAGNOSIS — M54.16 RADICULOPATHY, LUMBAR REGION: ICD-10-CM

## 2024-11-29 PROCEDURE — 97110 THERAPEUTIC EXERCISES: CPT | Mod: GP,CQ

## 2024-11-29 ASSESSMENT — PAIN - FUNCTIONAL ASSESSMENT: PAIN_FUNCTIONAL_ASSESSMENT: 0-10

## 2024-11-29 ASSESSMENT — PAIN SCALES - GENERAL: PAINLEVEL_OUTOF10: 0 - NO PAIN

## 2024-11-29 NOTE — PROGRESS NOTES
"Physical Therapy Treatment    Patient Name: Jana Galloway  MRN: [id number]  Encounter date: 11/29/2024    Time Calculation  Start Time: 0942  Stop Time: 1028  Time Calculation (min): 46 min  PT Therapeutic Procedures Time Entry  Therapeutic Exercise Time Entry: 42    Visit # 3 of 10  Visits/Dates Authorized: PT AUTH RECEIVED FOR 20 VISITS FOR 16518, 15274, 90668, 90843, 71780, 50395 FROM 10/29/24 TO 12/31/24 AUTH # [id number]  JS    EVAL ONLY -  [facility] - AUTH REQ / $3500 DEDUCT MET / $5100 OOP not met / 90% COVERAGE / 30V pt/ot MAX / PER CONTIGO w/s / ds 10/28/24.     Current Problem:   Problem List Items Addressed This Visit             ICD-10-CM    Other intervertebral disc displacement, lumbar region M51.26    Radiculopathy, lumbar region M54.16         Precautions: 10llb lifting restriction          Subjective   General:    Patient states she is feeling achy today, no real pain. Patient reports she has numbness along R shin. Patient states HEP is going well, doing them at least once per day so far.     Pre-Treatment Symptoms:   Pain Assessment: 0-10  0-10 (Numeric) Pain Score: 0 - No pain (achy)    Objective   Findings:   Vitals:             Treatments:      Therapeutic Exercise 97344:   Nustep L4 6 min LE only   Shuttle leg press DL 75-87# 2x12 , SL 37# 1x12 ea  TB SS orange 2 laps   Quadruped sit back to press up (gentle with this one, don't force flexion) 1x10  Carlos pose to R and L 1x10 ea  HL LTR (gentle) x10 R/L   HL hip adduction iso 2x10  5\" holds  HL hip abduction iso green 2x10  5\" holds   DL bridge x12 , with Tball x12   HL Tball abdominal iso 2x10  5\" holds  Supine 90/90 active HS stretch and nerve glide x12 R/L (helped reduce numbness in R shin)   Quadruped hip extension x5 R/L  Bird dog x5 R/L   TB palloff press purple x10 R/L   - Perform all exercises in pain-free range, progressing as tolerated    DNP:  Supine SKTC, piriformis stretch, figure 4 stretching 20\" holds R/L  HL glute sets " "10x5\"  HL TA + BKFO x10 R/L  HL PPT (pain-free range)  Prone knee flexion x10 R/L  Prone hip IR/ER AROM with TA x10 R/L  Lateral shift correction at wall (L elbow on wall, pushing pelvis to L) x10  Standing lumbar extension - with and without sustained R sidebend x10  Standing retractions and extension for posture strength  Previous and reviewed:  Educated in Low back /disc protection  No bending or lifting,  Sit with a roll and improve rising  Monitor your symptoms  Prone on elbows 10 minutes ever 2 hours.      HEP / Access Codes:   Access Code: [email or web address] - Date: 11/08/2024  - Supine Single Knee to Chest Stretch  - 1 x daily - 2 sets - 20 seconds hold  - Supine Piriformis Stretch with Leg Straight  - 1 x daily - 2 sets - 20 seconds hold  - Supine Figure 4 Piriformis Stretch  - 1 x daily - 2 sets - 20 seconds hold  - Supine Hamstring Stretch  - 1 x daily - 15 reps  - Hooklying Gluteal Sets  - 1 x daily - 15 reps - 5 seconds hold  - Bent Knee Fallouts with Alternating Legs  - 1 x daily - 10 reps  - Supine Posterior Pelvic Tilt  - 1 x daily - 15 reps - 5 seconds hold  - Supine Bridge  - 1 x daily - 2 sets - 10 reps  - Prone Knee Flexion AROM  - 1 x daily - 10-20 reps  - Prone Hip Internal and External Rotation AROM  - 1 x daily - 10-20 reps  - Quadruped Rock Back into Prone Press Up  - 1 x daily - 10 reps  - Lateral Shift Correction at Wall  - 1 x daily - 10 reps  - Standing Lumbar Extension  - 1 x daily - 10 reps  Added side stepping with light resistance band    Assessment:    Patient tolerated additional hip/core strengthening today without complaint of pain in lumbar or R LE. Patient had some relief from numbness after performing sciatic nerve glides. Issued side stepping as HEP at end of session along with tband.      Post-Treatment Symptoms:   Response to Interventions: Relief    Plan:    Progress as able     Goals:   Active       PT Problem       PT Goal 1       Start:  10/30/24    Expected End:  01/27/25       " 1. Pt will improve Low back disability score to < 10% impairment  2. Pt will be able to work through full day without increased pain or radicular sx  3. Pt will be able to bend to allow her to put on shoes and sock, especially in am without increased pain or radicular sx  4. Pt will demonstrate good core stabilization with bending and lifting, and no increased sx  5. Pt will be independent with HEP

## 2024-12-06 ENCOUNTER — TREATMENT (OUTPATIENT)
Dept: PHYSICAL THERAPY | Facility: CLINIC | Age: 54
End: 2024-12-06
Payer: COMMERCIAL

## 2024-12-06 DIAGNOSIS — M51.26 OTHER INTERVERTEBRAL DISC DISPLACEMENT, LUMBAR REGION: ICD-10-CM

## 2024-12-06 DIAGNOSIS — M54.16 RADICULOPATHY, LUMBAR REGION: ICD-10-CM

## 2024-12-06 PROCEDURE — 97110 THERAPEUTIC EXERCISES: CPT | Mod: GP

## 2024-12-06 ASSESSMENT — PAIN - FUNCTIONAL ASSESSMENT: PAIN_FUNCTIONAL_ASSESSMENT: 0-10

## 2024-12-06 ASSESSMENT — PAIN SCALES - GENERAL: PAINLEVEL_OUTOF10: 0 - NO PAIN

## 2024-12-06 NOTE — PROGRESS NOTES
Physical Therapy Treatment    Patient Name: Jana Galloway  MRN: [id number]  Encounter date: 12/6/2024    Time Calculation  Start Time: 0945  Stop Time: 1027  Time Calculation (min): 42 min  PT Therapeutic Procedures Time Entry  Therapeutic Exercise Time Entry: 42    Visit # 4 of 10  Visits/Dates Authorized: PT AUTH RECEIVED FOR 20 VISITS FOR 51775, 58242, 36505, 01244, 63497, 52541 FROM 10/29/24 TO 12/31/24 AUTH # [id number]  JS    EVAL ONLY -  [facility] - AUTH REQ / $3500 DEDUCT MET / $5100 OOP not met / 90% COVERAGE / 30V pt/ot MAX / PER CONTIGO w/s / ds 10/28/24.     Current Problem:   Problem List Items Addressed This Visit             ICD-10-CM    Other intervertebral disc displacement, lumbar region M51.26    Radiculopathy, lumbar region M54.16     Precautions: 10llb lifting restriction          Subjective   General:    Patient reports she's a little achy, but previous back and radiating LBP is pretty much gone. Compliant with HEP.    Pre-Treatment Symptoms:   Pain Assessment: 0-10  0-10 (Numeric) Pain Score: 0 - No pain    Objective   Findings:           Treatments:      Therapeutic Exercise 66320:   TM fw 2 mph / retro 1 mph   x 2 minutes each   Hip flexor stretch at staircase  L green TBSS 2 laps   L green fw/bw monster 2 laps   [place] loop around feet march 2x5  Cable row 7.5# 1x12 with TA  Cable straight arm pulldown 5# 1x12 with TA  Cable stirs 7.5# x10 cw/ccw R/L  Cable fw walkouts with unilateral anti-rotation 10#  Shuttle standing hip extension 12.5# x12 R/L   Shuttle leg press DL # 2x12 , SL 37# 2x12 ea  DL bridge with ball / blue band 1x12 each  SL bridge 2x5 each R/L   Prone propping 1', prone press up x10  Quadruped sit back to press up (pain-free ranges) 1x10  Standing L pelvic shift + lumbar extension (heavy cues)     DNP  Quadruped sit back to press up (gentle with this one, don't force flexion) 1x10  Carlos pose to R and L 1x10 ea  DL bridge x12 , with Tball x12   HL Tball abdominal iso  "2x10  5\" holds  Supine 90/90 active HS stretch and nerve glide x12 R/L (helped reduce numbness in R shin)   Bird dog x5 R/L   Prone pro    - Perform all exercises in pain-free range, progressing as tolerated    Supine SKTC, piriformis stretch, figure 4 stretching 20\" holds R/L  HL glute sets 10x5\"  HL TA + BKFO x10 R/L  HL PPT (pain-free range)  Prone knee flexion x10 R/L  Prone hip IR/ER AROM with TA x10 R/L  Lateral shift correction at wall (L elbow on wall, pushing pelvis to L) x10  Standing lumbar extension - with and without sustained R sidebend x10  Standing retractions and extension for posture strength  Previous and reviewed:  Educated in Low back /disc protection  No bending or lifting,  Sit with a roll and improve rising  Monitor your symptoms  Prone on elbows 10 minutes ever 2 hours.      HEP / Access Codes:   Access Code: [email or web address] - Date: 11/08/2024  - Supine Single Knee to Chest Stretch  - 1 x daily - 2 sets - 20 seconds hold  - Supine Piriformis Stretch with Leg Straight  - 1 x daily - 2 sets - 20 seconds hold  - Supine Figure 4 Piriformis Stretch  - 1 x daily - 2 sets - 20 seconds hold  - Supine Hamstring Stretch  - 1 x daily - 15 reps  - Hooklying Gluteal Sets  - 1 x daily - 15 reps - 5 seconds hold  - Bent Knee Fallouts with Alternating Legs  - 1 x daily - 10 reps  - Supine Posterior Pelvic Tilt  - 1 x daily - 15 reps - 5 seconds hold  - Supine Bridge  - 1 x daily - 2 sets - 10 reps  - Prone Knee Flexion AROM  - 1 x daily - 10-20 reps  - Prone Hip Internal and External Rotation AROM  - 1 x daily - 10-20 reps  - Quadruped Rock Back into Prone Press Up  - 1 x daily - 10 reps  - Lateral Shift Correction at Wall  - 1 x daily - 10 reps  - Standing Lumbar Extension  - 1 x daily - 10 reps  Added side stepping with light resistance band    Assessment:    Progressed weight bearing core activities, and incorporated prone pressups and combined L pelvic shift + lumbar extension into HEP. Tolerated treatment " well, fatigued but no increased pain by end of session.    Post-Treatment Symptoms:   Response to Interventions: No change in pain    Plan:    Progress as able     Goals:   Active       PT Problem       PT Goal 1       Start:  10/30/24    Expected End:  01/27/25       1. Pt will improve Low back disability score to < 10% impairment  2. Pt will be able to work through full day without increased pain or radicular sx  3. Pt will be able to bend to allow her to put on shoes and sock, especially in am without increased pain or radicular sx  4. Pt will demonstrate good core stabilization with bending and lifting, and no increased sx  5. Pt will be independent with HEP

## 2024-12-09 ENCOUNTER — TELEPHONE (OUTPATIENT)
Dept: PRIMARY CARE | Facility: CLINIC | Age: 54
End: 2024-12-09
Payer: COMMERCIAL

## 2024-12-09 ENCOUNTER — PHARMACY VISIT (OUTPATIENT)
Dept: PHARMACY | Facility: CLINIC | Age: 54
End: 2024-12-09
Payer: COMMERCIAL

## 2024-12-09 DIAGNOSIS — J01.00 ACUTE NON-RECURRENT MAXILLARY SINUSITIS: Primary | ICD-10-CM

## 2024-12-09 PROCEDURE — RXMED WILLOW AMBULATORY MEDICATION CHARGE

## 2024-12-09 PROCEDURE — RXOTC WILLOW AMBULATORY OTC CHARGE

## 2024-12-09 RX ORDER — AZITHROMYCIN 250 MG/1
TABLET, FILM COATED ORAL
Qty: 6 TABLET | Refills: 0 | Status: SHIPPED | OUTPATIENT
Start: 2024-12-09 | End: 2024-12-14

## 2024-12-13 ENCOUNTER — TREATMENT (OUTPATIENT)
Dept: PHYSICAL THERAPY | Facility: CLINIC | Age: 54
End: 2024-12-13
Payer: COMMERCIAL

## 2024-12-13 DIAGNOSIS — M51.26 OTHER INTERVERTEBRAL DISC DISPLACEMENT, LUMBAR REGION: ICD-10-CM

## 2024-12-13 DIAGNOSIS — M54.16 RADICULOPATHY, LUMBAR REGION: ICD-10-CM

## 2024-12-13 PROCEDURE — 97110 THERAPEUTIC EXERCISES: CPT | Mod: GP,CQ

## 2024-12-13 ASSESSMENT — PAIN SCALES - GENERAL: PAINLEVEL_OUTOF10: 0 - NO PAIN

## 2024-12-13 ASSESSMENT — PAIN - FUNCTIONAL ASSESSMENT: PAIN_FUNCTIONAL_ASSESSMENT: 0-10

## 2024-12-13 NOTE — PROGRESS NOTES
Physical Therapy Treatment    Patient Name: Jana Galloway  MRN: [id number]  Encounter date: 12/13/2024    Time Calculation  Start Time: 0946  Stop Time: 1029  Time Calculation (min): 43 min  PT Therapeutic Procedures Time Entry  Therapeutic Exercise Time Entry: 42    Visit # 5 of 10  Visits/Dates Authorized: PT AUTH RECEIVED FOR 20 VISITS FOR 09580, 67331, 74306, 24991, 07673, 87633 FROM 10/29/24 TO 12/31/24 AUTH # [id number]  JS    EVAL ONLY - [facility] - AUTH REQ / $3500 DEDUCT MET / $5100 OOP not met / 90% COVERAGE / 30V pt/ot MAX / PER CONTIGO w/s / ds 10/28/24.     Current Problem:   Problem List Items Addressed This Visit             ICD-10-CM    Other intervertebral disc displacement, lumbar region M51.26    Radiculopathy, lumbar region M54.16       Precautions: 10 lbs lifting restriction          Subjective   General:    Patient reports she is having no pain this morning, was a little sore after last visit but subsided within a day.     Pre-Treatment Symptoms:   Pain Assessment: 0-10  0-10 (Numeric) Pain Score: 0 - No pain    Objective   Findings:           Treatments:      Therapeutic Exercise 72441:   TM fw 2 mph / retro 1 mph   x 2 minutes each   Standing L pelvic shift + lumbar extension (heavy cues)   L green TBSS 2 laps   L green fw/bw monster 2 laps   L green loop around feet march 2x10 R/L   Cable row 7.5# 2x10 with TA (heavy cues)  Cable straight arm pulldown 5# 2x10 with TA  Cable stirs 7.5# 1x10 cw/ccw R/L  Cable walkouts with unilateral anti-rotation 10# 1x5 R/L  Shuttle leg press DL # 2x12 , SL 37-50# 2x12 R/L  Shuttle standing hip extension 12.5# 2x10 R/L   S/L clamshell L blue 2x12 R/L  S/L reverse clamshell no resist 1x12 , pink 1x12 R/L   Prone propping 1', prone press up x10    DNP:  Hip flexor stretch at staircase  DL bridge with ball / blue band 1x12 each  SL bridge 2x5 each R/L   Quadruped sit back to press up (pain-free ranges) 1x10  Quadruped sit back to press up (gentle with  "this one, don't force flexion) 1x10  Carlos pose to R and L 1x10 ea  DL bridge x12 , with Tball x12   HL Tball abdominal iso 2x10  5\" holds  Supine 90/90 active HS stretch and nerve glide x12 R/L (helped reduce numbness in R shin)   Bird dog x5 R/L   Supine SKTC, piriformis stretch, figure 4 stretching 20\" holds R/L  HL glute sets 10x5\"  HL TA + BKFO x10 R/L  HL PPT (pain-free range)  Prone knee flexion x10 R/L  Prone hip IR/ER AROM with TA x10 R/L  Lateral shift correction at wall (L elbow on wall, pushing pelvis to L) x10  Standing lumbar extension - with and without sustained R sidebend x10  Sit with a roll and improve rising  Monitor your symptoms  Prone on elbows 10 minutes ever 2 hours.      HEP / Access Codes:   Access Code: [email or web address] - Date: 11/08/2024  - Supine Single Knee to Chest Stretch  - 1 x daily - 2 sets - 20 seconds hold  - Supine Piriformis Stretch with Leg Straight  - 1 x daily - 2 sets - 20 seconds hold  - Supine Figure 4 Piriformis Stretch  - 1 x daily - 2 sets - 20 seconds hold  - Supine Hamstring Stretch  - 1 x daily - 15 reps  - Hooklying Gluteal Sets  - 1 x daily - 15 reps - 5 seconds hold  - Bent Knee Fallouts with Alternating Legs  - 1 x daily - 10 reps  - Supine Posterior Pelvic Tilt  - 1 x daily - 15 reps - 5 seconds hold  - Supine Bridge  - 1 x daily - 2 sets - 10 reps  - Prone Knee Flexion AROM  - 1 x daily - 10-20 reps  - Prone Hip Internal and External Rotation AROM  - 1 x daily - 10-20 reps  - Quadruped Rock Back into Prone Press Up  - 1 x daily - 10 reps  - Lateral Shift Correction at Wall  - 1 x daily - 10 reps  - Standing Lumbar Extension  - 1 x daily - 10 reps  Added side stepping with light resistance band    Assessment:    Patient tolerated extra sets with exercises today, no complaints of LBP throughout. Patient requires occasional verbal and tactile cues for proper exercise techniques, mainly TA brace. Patient had no complaints by end of session. Patient to try to schedule " additional visits up to 12/31/24.     Post-Treatment Symptoms:   Response to Interventions: No change in pain    Plan:    Progress as able     Goals:   Active       PT Problem       PT Goal 1       Start:  10/30/24    Expected End:  01/27/25       1. Pt will improve Low back disability score to < 10% impairment  2. Pt will be able to work through full day without increased pain or radicular sx  3. Pt will be able to bend to allow her to put on shoes and sock, especially in am without increased pain or radicular sx  4. Pt will demonstrate good core stabilization with bending and lifting, and no increased sx  5. Pt will be independent with HEP

## 2024-12-16 DIAGNOSIS — M51.26 HNP (HERNIATED NUCLEUS PULPOSUS), LUMBAR: ICD-10-CM

## 2024-12-16 RX ORDER — HYDROCODONE BITARTRATE AND ACETAMINOPHEN 5; 325 MG/1; MG/1
1 TABLET ORAL EVERY 4 HOURS PRN
Qty: 20 TABLET | Refills: 0 | Status: CANCELLED | OUTPATIENT
Start: 2024-12-16

## 2024-12-16 NOTE — TELEPHONE ENCOUNTER
I have not seen her since the initial encounter in October when I prescribed Norco.  I have no update on her status.  For these reasons I must not refill an opiate.

## 2024-12-17 ENCOUNTER — TREATMENT (OUTPATIENT)
Dept: PHYSICAL THERAPY | Facility: CLINIC | Age: 54
End: 2024-12-17
Payer: COMMERCIAL

## 2024-12-17 DIAGNOSIS — M51.26 OTHER INTERVERTEBRAL DISC DISPLACEMENT, LUMBAR REGION: ICD-10-CM

## 2024-12-17 DIAGNOSIS — M54.16 RADICULOPATHY, LUMBAR REGION: ICD-10-CM

## 2024-12-17 PROCEDURE — 97110 THERAPEUTIC EXERCISES: CPT | Mod: GP,CQ

## 2024-12-17 ASSESSMENT — PAIN SCALES - GENERAL: PAINLEVEL_OUTOF10: 0 - NO PAIN

## 2024-12-17 ASSESSMENT — PAIN - FUNCTIONAL ASSESSMENT: PAIN_FUNCTIONAL_ASSESSMENT: 0-10

## 2024-12-20 ENCOUNTER — TREATMENT (OUTPATIENT)
Dept: PHYSICAL THERAPY | Facility: CLINIC | Age: 54
End: 2024-12-20
Payer: COMMERCIAL

## 2024-12-20 ENCOUNTER — TELEPHONE (OUTPATIENT)
Dept: ORTHOPEDIC SURGERY | Facility: CLINIC | Age: 54
End: 2024-12-20

## 2024-12-20 DIAGNOSIS — M51.26 OTHER INTERVERTEBRAL DISC DISPLACEMENT, LUMBAR REGION: ICD-10-CM

## 2024-12-20 DIAGNOSIS — M54.16 RADICULOPATHY, LUMBAR REGION: ICD-10-CM

## 2024-12-20 PROCEDURE — 97112 NEUROMUSCULAR REEDUCATION: CPT | Mod: GP,CQ

## 2024-12-20 PROCEDURE — 97110 THERAPEUTIC EXERCISES: CPT | Mod: GP,CQ

## 2024-12-20 ASSESSMENT — PAIN SCALES - GENERAL: PAINLEVEL_OUTOF10: 0 - NO PAIN

## 2024-12-20 ASSESSMENT — PAIN - FUNCTIONAL ASSESSMENT: PAIN_FUNCTIONAL_ASSESSMENT: 0-10

## 2024-12-20 NOTE — PROGRESS NOTES
"Physical Therapy Treatment    Patient Name: Jana Galloway  MRN: [id number]  Encounter date: 12/20/2024    Time Calculation  Start Time: 0903  Stop Time: 0943  Time Calculation (min): 40 min  PT Therapeutic Procedures Time Entry  Neuromuscular Re-Education Time Entry: 6  Therapeutic Exercise Time Entry: 32    Visit # 7 of 10  Visits/Dates Authorized: PT AUTH RECEIVED FOR 20 VISITS FOR 09989, 48582, 86386, 96221, 17805, 51310 FROM 10/29/24 TO 12/31/24 AUTH # [id number]  JS    EVAL ONLY -  [facility] - AUTH REQ / $3500 DEDUCT MET / $5100 OOP not met / 90% COVERAGE / 30V pt/ot MAX / PER [facility]O w/s / ds 10/28/24.     Current Problem:   Problem List Items Addressed This Visit             ICD-10-CM    Other intervertebral disc displacement, lumbar region M51.26    Radiculopathy, lumbar region M54.16           Precautions:    N/A       Subjective   General:    Patient reports she was a little sore after last visit, mainly legs, subsided within a day.     Pre-Treatment Symptoms:   Pain Assessment: 0-10  0-10 (Numeric) Pain Score: 0 - No pain    Objective   Findings:                                                     Treatments:      Therapeutic Exercise 76871:   Scifit L5 6 min seat 7   Resisted Gait 4 way 12.5# x3 ea  Cable stirs 7.5# 2x10 cw/ccw R/L  Shuttle leg press -112# 2x12 , SL 50# 2x12 R/L  Forearm/knee plank 2 x 30\" holds  Forearm/knee side plank 2 x 15\" holds R/L    DNP:  TM fw 2 mph 3 min / retro 1.3 mph  2 min   Leg Curl 40-45# 2x12  Leg Extension 15# 2x12  L green TBSS 3 laps   L green fw/bw monster 3 laps   Shuttle standing hip extension 12.5# 2x10 R/L   Bird dog 1x5 , 1x10 R/L   S/L clamshell L blue 2x15 R/L  S/L reverse clamshell pink 2x15 R/L  Standing L pelvic shift + lumbar extension (heavy cues)   L green loop around feet march 2x10 R/L   Cable row 7.5# 2x10 with TA (heavy cues)  Cable straight arm pulldown 5# 2x10 with TA  Cable walkouts with unilateral anti-rotation 10# 1x5 R/L  Prone " "propping 1', prone press up x10  Hip flexor stretch at staircase  DL bridge with ball / blue band 1x12 each  SL bridge 2x5 each R/L   Quadruped sit back to press up (pain-free ranges) 1x10  Carlos pose to R and L 1x10 ea  DL bridge x12 , with Tball x12   HL Tball abdominal iso 2x10  5\" holds  Supine 90/90 active HS stretch and nerve glide x12 R/L (helped reduce numbness in R shin)   Supine SKTC, piriformis stretch, figure 4 stretching 20\" holds R/L  HL glute sets 10x5\"  HL TA + BKFO x10 R/L  HL PPT (pain-free range)  Prone knee flexion x10 R/L  Lateral shift correction at wall (L elbow on wall, pushing pelvis to L) x10  Standing lumbar extension - with and without sustained R sidebend x10  Sit with a roll and improve rising  Prone on elbows 10 minutes ever 2 hours.      Neuromuscular Re-education:  Rocker board AP/Lateral rocks and balance 60 sec ea  SLS firm 30 sec x 1 R/L     HEP / Access Codes:   Access Code: [email or web address] - Date: 11/08/2024  - Supine Single Knee to Chest Stretch  - 1 x daily - 2 sets - 20 seconds hold  - Supine Piriformis Stretch with Leg Straight  - 1 x daily - 2 sets - 20 seconds hold  - Supine Figure 4 Piriformis Stretch  - 1 x daily - 2 sets - 20 seconds hold  - Supine Hamstring Stretch  - 1 x daily - 15 reps  - Hooklying Gluteal Sets  - 1 x daily - 15 reps - 5 seconds hold  - Bent Knee Fallouts with Alternating Legs  - 1 x daily - 10 reps  - Supine Posterior Pelvic Tilt  - 1 x daily - 15 reps - 5 seconds hold  - Supine Bridge  - 1 x daily - 2 sets - 10 reps  - Prone Knee Flexion AROM  - 1 x daily - 10-20 reps  - Prone Hip Internal and External Rotation AROM  - 1 x daily - 10-20 reps  - Quadruped Rock Back into Prone Press Up  - 1 x daily - 10 reps  - Lateral Shift Correction at Wall  - 1 x daily - 10 reps  - Standing Lumbar Extension  - 1 x daily - 10 reps  Added side stepping with light resistance band    Assessment:    Patient tolerated treatment well, no complaints of pain throughout. Focused " on continued hip/core strengthening, also worked on some balance training to promote stability. Patient appears to be working hard in clinic and motivated to decrease pain and restore all functional deficits. Patient encouraged to continue with HEP compliance to maintain current progress.      Post-Treatment Symptoms:   Response to Interventions: No change in pain (little fatigued)    Plan:    Progress as able     Goals:   Active       PT Problem       PT Goal 1       Start:  10/30/24    Expected End:  01/27/25       1. Pt will improve Low back disability score to < 10% impairment  2. Pt will be able to work through full day without increased pain or radicular sx  3. Pt will be able to bend to allow her to put on shoes and sock, especially in am without increased pain or radicular sx  4. Pt will demonstrate good core stabilization with bending and lifting, and no increased sx  5. Pt will be independent with HEP

## 2024-12-20 NOTE — TELEPHONE ENCOUNTER
12/20/24  PT called asking for refill of hydrocodone 5-325mg. [facility]

## 2024-12-24 ENCOUNTER — TREATMENT (OUTPATIENT)
Dept: PHYSICAL THERAPY | Facility: CLINIC | Age: 54
End: 2024-12-24
Payer: COMMERCIAL

## 2024-12-24 DIAGNOSIS — M51.26 OTHER INTERVERTEBRAL DISC DISPLACEMENT, LUMBAR REGION: ICD-10-CM

## 2024-12-24 DIAGNOSIS — M54.16 RADICULOPATHY, LUMBAR REGION: ICD-10-CM

## 2024-12-24 PROCEDURE — 97110 THERAPEUTIC EXERCISES: CPT | Mod: GP,CQ

## 2024-12-24 ASSESSMENT — PAIN SCALES - GENERAL: PAINLEVEL_OUTOF10: 0 - NO PAIN

## 2024-12-24 ASSESSMENT — PAIN - FUNCTIONAL ASSESSMENT: PAIN_FUNCTIONAL_ASSESSMENT: 0-10

## 2024-12-24 NOTE — PROGRESS NOTES
"Physical Therapy Treatment    Patient Name: Jana Galloway  MRN: [id number]  Encounter date: 12/24/2024 PT Received On: 12/24/24  Time Calculation  Start Time: 1200  Stop Time: 1240  Time Calculation (min): 40 min  PT Therapeutic Procedures Time Entry  Therapeutic Exercise Time Entry: 40    Visit # 8 of 10  Visits/Dates Authorized: PT AUTH RECEIVED FOR 20 VISITS FOR 53752, 73844, 24083, 41063, 17530, 73222 FROM 10/29/24 TO 12/31/24 AUTH # [id number]  JS    EVAL ONLY -  [facility] - AUTH REQ / $3500 DEDUCT MET / $5100 OOP not met / 90% COVERAGE / 30V pt/ot MAX / PER [facility] w/s / ds 10/28/24.     Current Problem:   Problem List Items Addressed This Visit             ICD-10-CM    Other intervertebral disc displacement, lumbar region M51.26    Radiculopathy, lumbar region M54.16             Precautions:    N/A       Subjective   General:    Patient doing well with HEP. Tolerating work day well and does not need ice in car anymore    Pre-Treatment Symptoms:   Pain Assessment: 0-10  0-10 (Numeric) Pain Score: 0 - No pain    Objective   Findings:                                                     Treatments:      Therapeutic Exercise 80738:   Scifit L5 6 min seat 7   Resisted Gait 4 way 12.5# x3 ea  Cable stirs 7.5# 2x10 cw/ccw R/L  Shuttle leg press -112# 2x12 , SL 50# 2x12 R/L  Forearm/knee plank 2 x 30\" holds  Forearm/knee side plank 2 x 15\" holds R/L  Cable row 7.5# 2x10 with TA (heavy cues)  Cable straight arm pulldown 5# 2x10 with TA  Single arm chest press#7.5  Leg Curl 40-45# 2x12  Leg Extension 15# 2x12  Side step with band and squats at end   Tball squish 5-10 sec 10x  Tva march up up down down  Tva with fall out work IR for pelvic floor mm  Tball prayer isometric on knees      DNP:  TM fw 2 mph 3 min / retro 1.3 mph  2 min   L green TBSS 3 laps   L green fw/bw monster 3 laps   Shuttle standing hip extension 12.5# 2x10 R/L   Bird dog 1x5 , 1x10 R/L   S/L clamshell L blue 2x15 R/L  S/L reverse anjel " "pink 2x15 R/L  Standing L pelvic shift + lumbar extension (heavy cues)   L green loop around feet march 2x10 R/L   Cable walkouts with unilateral anti-rotation 10# 1x5 R/L  Prone propping 1', prone press up x10  Hip flexor stretch at staircase  DL bridge with ball / blue band 1x12 each  SL bridge 2x5 each R/L   Quadruped sit back to press up (pain-free ranges) 1x10  Carlos pose to R and L 1x10 ea  DL bridge x12 , with Tball x12   HL Tball abdominal iso 2x10  5\" holds  Supine 90/90 active HS stretch and nerve glide x12 R/L (helped reduce numbness in R shin)   Supine SKTC, piriformis stretch, figure 4 stretching 20\" holds R/L  HL glute sets 10x5\"  HL TA + BKFO x10 R/L  HL PPT (pain-free range)  Prone knee flexion x10 R/L  Lateral shift correction at wall (L elbow on wall, pushing pelvis to L) x10  Standing lumbar extension - with and without sustained R sidebend x10  Sit with a roll and improve rising  Prone on elbows 10 minutes ever 2 hours.      Neuromuscular Re-education: DNP  Rocker board AP/Lateral rocks and balance 60 sec ea  SLS firm 30 sec x 1 R/L     HEP / Access Codes:   Access Code: [email or web address] - Date: 11/08/2024  - Supine Single Knee to Chest Stretch  - 1 x daily - 2 sets - 20 seconds hold  - Supine Piriformis Stretch with Leg Straight  - 1 x daily - 2 sets - 20 seconds hold  - Supine Figure 4 Piriformis Stretch  - 1 x daily - 2 sets - 20 seconds hold  - Supine Hamstring Stretch  - 1 x daily - 15 reps  - Hooklying Gluteal Sets  - 1 x daily - 15 reps - 5 seconds hold  - Bent Knee Fallouts with Alternating Legs  - 1 x daily - 10 reps  - Supine Posterior Pelvic Tilt  - 1 x daily - 15 reps - 5 seconds hold  - Supine Bridge  - 1 x daily - 2 sets - 10 reps  - Prone Knee Flexion AROM  - 1 x daily - 10-20 reps  - Prone Hip Internal and External Rotation AROM  - 1 x daily - 10-20 reps  - Quadruped Rock Back into Prone Press Up  - 1 x daily - 10 reps  - Lateral Shift Correction at Wall  - 1 x daily - 10 reps  - " Standing Lumbar Extension  - 1 x daily - 10 reps  Added side stepping with light resistance band    Assessment:    Patient tolerated treatment well, no complaints of pain throughout. Focused on continued hip/core strengthening,  Still utilizing extension and left shift for HEP and no issues with that  Good core activation today patient will cont with bands and my join gym but not sure. Will see primary PT next visit for last session    Post-Treatment Symptoms:    No pain    Plan:    Progress as able     Goals:   Active       PT Problem       PT Goal 1       Start:  10/30/24    Expected End:  01/27/25       1. Pt will improve Low back disability score to < 10% impairment  2. Pt will be able to work through full day without increased pain or radicular sx  3. Pt will be able to bend to allow her to put on shoes and sock, especially in am without increased pain or radicular sx  4. Pt will demonstrate good core stabilization with bending and lifting, and no increased sx  5. Pt will be independent with HEP

## 2024-12-27 ENCOUNTER — TREATMENT (OUTPATIENT)
Dept: PHYSICAL THERAPY | Facility: CLINIC | Age: 54
End: 2024-12-27
Payer: COMMERCIAL

## 2024-12-27 DIAGNOSIS — M54.16 RADICULOPATHY, LUMBAR REGION: ICD-10-CM

## 2024-12-27 DIAGNOSIS — M51.26 OTHER INTERVERTEBRAL DISC DISPLACEMENT, LUMBAR REGION: ICD-10-CM

## 2024-12-27 PROCEDURE — 97110 THERAPEUTIC EXERCISES: CPT | Mod: GP

## 2024-12-27 ASSESSMENT — PAIN - FUNCTIONAL ASSESSMENT: PAIN_FUNCTIONAL_ASSESSMENT: 0-10

## 2024-12-27 ASSESSMENT — PAIN SCALES - GENERAL: PAINLEVEL_OUTOF10: 0 - NO PAIN

## 2024-12-27 NOTE — PROGRESS NOTES
"Physical Therapy Treatment / Discharge    Patient Name: Jana Galloway  MRN: [id number]  Encounter date: 12/27/2024    Time Calculation  Start Time: 0730  Stop Time: 0812  Time Calculation (min): 42 min  PT Therapeutic Procedures Time Entry  Therapeutic Exercise Time Entry: 42    Visit # 9 of 10 (Discharge)  Visits/Dates Authorized: PT AUTH RECEIVED FOR 20 VISITS FOR 59537, 17609, 62320, 45467, 39077, 39580 FROM 10/29/24 TO 12/31/24 AUTH # [id number]  JS    EVAL ONLY -  [facility] - AUTH REQ / $3500 DEDUCT MET / $5100 OOP not met / 90% COVERAGE / 30V pt/ot MAX / PER [facility] w/s / ds 10/28/24.     Current Problem:   Problem List Items Addressed This Visit             ICD-10-CM    Other intervertebral disc displacement, lumbar region M51.26    Radiculopathy, lumbar region M54.16     Precautions:    N/A       Subjective   General:    Patient reports significant improvement in low back / RLE pain and function since beginning therapy. She is mostly symptom-free with all daily activity, but can still get sore at night and/or after sitting for long durations. Compliant with HEP.    Pre-Treatment Symptoms:   Pain Assessment: 0-10  0-10 (Numeric) Pain Score: 0 - No pain    Objective   Findings:     Lumbar AROM: WFL  No radicular symptoms into RLE                                    Treatments:      Therapeutic Exercise 49145:   TM 5% grade fw 2 mph for 5 min  1/2 kneel hip flexor stretch  Supine nerve glides x10 R/L  Quadruped cat cow, sit back to press ups, thread the needle, bird dogs  Standing repeated lumbar extension x5, then with sustained R shift x5  Green TBSS and fw/bw monster x 2 laps each  Cable stirs 7.5# 2x10 cw/ccw R/L  row purple 2x10 with TA (heavy cues)  straight arm pulldown green with TA x15  Standing tball abd isometrics 10x5\"  Sit to stands with two 6 lb DBs 2x10  Leg Curl 45# 2x12  HEP    HEP / Access Codes:   Final updated HEP below    Access Code: [email or web address]  URL: [email or web address]  Date: " 12/27/2024  Prepared by: Pawan Trinh    Exercises  - Standing Lumbar Extension  - 1 x daily - 10 reps  - Half Kneeling Hip Flexor Stretch  - 1 x daily - 2 sets - 20 seconds hold  - Supine 90/90 Sciatic Nerve Glide with Knee Flexion/Extension  - 1 x daily - 10 reps  - Cat Cow  - 1 x daily - 5 reps - 5 seconds hold  - Quadruped Rock Back into Prone Press Up  - 1 x daily - 10 reps  - Quadruped Full Range Thoracic Rotation with Reach  - 1 x daily - 10 reps  - Bird Dog  - 1 x daily - 10 reps  - Side Stepping with Resistance at Ankles  - 1 x daily - 3 sets  - Forward and Backward Monster Walk with Resistance at Ankles and Counter Support  - 1 x daily - 3 sets  - Seated Abdominal Press into Swiss Ball  - 1 x daily - 10 reps - 5 seconds hold  - Sit to Stand Without Arm Support  - 1 x daily - 2 sets - 10 reps  - Standing Row with Anchored Resistance  - 1 x daily - 2 sets - 15 reps  - Shoulder Extension with Resistance  - 1 x daily - 2 sets - 15 reps  - Standing Anti-Rotation Press with Anchored Resistance  - 1 x daily - 2 sets - 10 reps      Assessment:    Patient has attended 9 total therapy visits including initial eval performed on 10/29/24. Patient reports significant improvement in pain and radicular symptoms, and is mostly symptom-free throughout the day and night with normal activity. Plans to continue with HEP only following today's visit.    Post-Treatment Symptoms:   Response to Interventions: No change in pain, 0/10    Plan:    Discharge with updated HEP    Goals:   Resolved       PT Problem       PT Goal 1 (Adequate for Discharge)       Start:  10/30/24    Expected End:  01/27/25       1. Pt will improve Low back disability score to < 10% impairment  2. Pt will be able to work through full day without increased pain or radicular sx  3. Pt will be able to bend to allow her to put on shoes and sock, especially in am without increased pain or radicular sx  4. Pt will demonstrate good core stabilization with  bending and lifting, and no increased sx  5. Pt will be independent with HEP      Goal Note       Not tested  2-4. Mostly met  5. Met

## 2025-01-31 ENCOUNTER — TELEPHONE (OUTPATIENT)
Dept: PRIMARY CARE | Facility: CLINIC | Age: 55
End: 2025-01-31
Payer: COMMERCIAL

## 2025-01-31 ENCOUNTER — PHARMACY VISIT (OUTPATIENT)
Dept: PHARMACY | Facility: CLINIC | Age: 55
End: 2025-01-31
Payer: COMMERCIAL

## 2025-01-31 DIAGNOSIS — J40 BRONCHITIS: Primary | ICD-10-CM

## 2025-01-31 PROCEDURE — RXOTC WILLOW AMBULATORY OTC CHARGE

## 2025-01-31 PROCEDURE — RXMED WILLOW AMBULATORY MEDICATION CHARGE

## 2025-01-31 RX ORDER — AZITHROMYCIN 250 MG/1
TABLET, FILM COATED ORAL
Qty: 6 TABLET | Refills: 0 | Status: SHIPPED | OUTPATIENT
Start: 2025-01-31 | End: 2025-02-05

## 2025-03-07 ENCOUNTER — CLINICAL SUPPORT (OUTPATIENT)
Dept: PRIMARY CARE | Facility: CLINIC | Age: 55
End: 2025-03-07
Payer: COMMERCIAL

## 2025-03-07 DIAGNOSIS — E79.0 URICACIDEMIA: ICD-10-CM

## 2025-03-08 LAB — URATE SERPL-MCNC: 4.8 MG/DL (ref 2.5–7)

## 2025-03-12 ENCOUNTER — PHARMACY VISIT (OUTPATIENT)
Dept: PHARMACY | Facility: CLINIC | Age: 55
End: 2025-03-12
Payer: COMMERCIAL

## 2025-03-12 DIAGNOSIS — M76.891 TENDONITIS OF KNEE, RIGHT: Primary | ICD-10-CM

## 2025-03-12 PROCEDURE — RXMED WILLOW AMBULATORY MEDICATION CHARGE

## 2025-03-12 RX ORDER — METHYLPREDNISOLONE 4 MG/1
TABLET ORAL
Qty: 21 TABLET | Refills: 0 | Status: SHIPPED | OUTPATIENT
Start: 2025-03-12

## 2025-05-27 ENCOUNTER — OFFICE VISIT (OUTPATIENT)
Dept: PRIMARY CARE | Facility: CLINIC | Age: 55
End: 2025-05-27
Payer: COMMERCIAL

## 2025-05-27 ENCOUNTER — TELEPHONE (OUTPATIENT)
Dept: PRIMARY CARE | Facility: CLINIC | Age: 55
End: 2025-05-27
Payer: COMMERCIAL

## 2025-05-27 VITALS
RESPIRATION RATE: 16 BRPM | HEIGHT: 67 IN | BODY MASS INDEX: 21.35 KG/M2 | SYSTOLIC BLOOD PRESSURE: 98 MMHG | DIASTOLIC BLOOD PRESSURE: 70 MMHG | HEART RATE: 92 BPM | TEMPERATURE: 95.5 F | WEIGHT: 136 LBS | OXYGEN SATURATION: 99 %

## 2025-05-27 DIAGNOSIS — L02.91 ABSCESS: Primary | ICD-10-CM

## 2025-05-27 DIAGNOSIS — L72.3 SEBACEOUS CYST: Primary | ICD-10-CM

## 2025-05-27 PROCEDURE — RXMED WILLOW AMBULATORY MEDICATION CHARGE

## 2025-05-27 PROCEDURE — 3008F BODY MASS INDEX DOCD: CPT | Performed by: FAMILY MEDICINE

## 2025-05-27 RX ORDER — SULFAMETHOXAZOLE AND TRIMETHOPRIM 800; 160 MG/1; MG/1
1 TABLET ORAL 2 TIMES DAILY
Qty: 14 TABLET | Refills: 0 | Status: SHIPPED | OUTPATIENT
Start: 2025-05-27 | End: 2025-06-04

## 2025-05-27 ASSESSMENT — PATIENT HEALTH QUESTIONNAIRE - PHQ9
2. FEELING DOWN, DEPRESSED OR HOPELESS: NOT AT ALL
1. LITTLE INTEREST OR PLEASURE IN DOING THINGS: NOT AT ALL
SUM OF ALL RESPONSES TO PHQ9 QUESTIONS 1 AND 2: 0

## 2025-05-27 ASSESSMENT — ENCOUNTER SYMPTOMS
DEPRESSION: 0
OCCASIONAL FEELINGS OF UNSTEADINESS: 0
LOSS OF SENSATION IN FEET: 0

## 2025-05-27 ASSESSMENT — PAIN SCALES - GENERAL: PAINLEVEL_OUTOF10: 0-NO PAIN

## 2025-05-27 ASSESSMENT — COLUMBIA-SUICIDE SEVERITY RATING SCALE - C-SSRS
1. IN THE PAST MONTH, HAVE YOU WISHED YOU WERE DEAD OR WISHED YOU COULD GO TO SLEEP AND NOT WAKE UP?: NO
6. HAVE YOU EVER DONE ANYTHING, STARTED TO DO ANYTHING, OR PREPARED TO DO ANYTHING TO END YOUR LIFE?: NO
2. HAVE YOU ACTUALLY HAD ANY THOUGHTS OF KILLING YOURSELF?: NO

## 2025-05-28 ENCOUNTER — PHARMACY VISIT (OUTPATIENT)
Dept: PHARMACY | Facility: CLINIC | Age: 55
End: 2025-05-28
Payer: COMMERCIAL

## 2025-05-30 ENCOUNTER — PHARMACY VISIT (OUTPATIENT)
Dept: PHARMACY | Facility: CLINIC | Age: 55
End: 2025-05-30

## 2025-05-30 PROCEDURE — RXOTC WILLOW AMBULATORY OTC CHARGE

## 2025-06-03 NOTE — PROGRESS NOTES
"Patient ID: Jana Galloway is a 55 y.o. female.    ProceduresSubjective   Patient ID: Jana Galloway is a 55 y.o. female who presents for Procedure (Pt is here for a removal on her chest.).    HPI here for cyst removal of chest.      Review of Systems    Objective   BP 98/70   Pulse 92   Temp 35.3 °C (95.5 °F) (Temporal)   Resp 16   Ht 1.702 m (5' 7\")   Wt 61.7 kg (136 lb)   SpO2 99%   BMI 21.30 kg/m²     Physical Examsmall .8 cm soft mobile cystic lesion of anterior chest wall over lower sternal region.     Assessment/Plan   Problem List Items Addressed This Visit    None  Visit Diagnoses         Codes      Sebaceous cyst    -  Primary L72.3        Lesion excised and closed with 4-0 prolene.  Follow up for suture removal 1 week sooner if worse.        "

## 2025-07-28 ENCOUNTER — PHARMACY VISIT (OUTPATIENT)
Dept: PHARMACY | Facility: CLINIC | Age: 55
End: 2025-07-28
Payer: COMMERCIAL

## 2025-07-28 ENCOUNTER — TELEPHONE (OUTPATIENT)
Dept: PRIMARY CARE | Facility: CLINIC | Age: 55
End: 2025-07-28
Payer: COMMERCIAL

## 2025-07-28 DIAGNOSIS — J40 BRONCHITIS: Primary | ICD-10-CM

## 2025-07-28 PROCEDURE — RXMED WILLOW AMBULATORY MEDICATION CHARGE

## 2025-07-28 PROCEDURE — RXOTC WILLOW AMBULATORY OTC CHARGE

## 2025-07-28 RX ORDER — AZITHROMYCIN 250 MG/1
TABLET, FILM COATED ORAL
Qty: 6 TABLET | Refills: 0 | Status: SHIPPED | OUTPATIENT
Start: 2025-07-28 | End: 2025-08-02

## 2025-07-29 ENCOUNTER — PHARMACY VISIT (OUTPATIENT)
Dept: PHARMACY | Facility: CLINIC | Age: 55
End: 2025-07-29
Payer: COMMERCIAL

## 2025-07-29 DIAGNOSIS — R05.8 OTHER COUGH: Primary | ICD-10-CM

## 2025-07-29 PROCEDURE — RXMED WILLOW AMBULATORY MEDICATION CHARGE

## 2025-07-29 RX ORDER — BENZONATATE 100 MG/1
100 CAPSULE ORAL 3 TIMES DAILY PRN
Qty: 42 CAPSULE | Refills: 0 | Status: SHIPPED | OUTPATIENT
Start: 2025-07-29 | End: 2025-08-28

## 2025-08-01 ENCOUNTER — PHARMACY VISIT (OUTPATIENT)
Dept: PHARMACY | Facility: CLINIC | Age: 55
End: 2025-08-01
Payer: COMMERCIAL

## 2025-08-01 DIAGNOSIS — L72.3 SEBACEOUS CYST: Primary | ICD-10-CM

## 2025-08-01 PROCEDURE — RXMED WILLOW AMBULATORY MEDICATION CHARGE

## 2025-08-01 RX ORDER — SULFAMETHOXAZOLE AND TRIMETHOPRIM 800; 160 MG/1; MG/1
1 TABLET ORAL 2 TIMES DAILY
Qty: 14 TABLET | Refills: 0 | Status: SHIPPED | OUTPATIENT
Start: 2025-08-01 | End: 2025-08-08

## 2025-08-01 RX ORDER — SULFAMETHOXAZOLE AND TRIMETHOPRIM 800; 160 MG/1; MG/1
TABLET ORAL EVERY 12 HOURS
COMMUNITY
Start: 2022-01-06 | End: 2025-08-01 | Stop reason: ENTERED-IN-ERROR

## 2025-08-29 ENCOUNTER — LAB REQUISITION (OUTPATIENT)
Dept: DERMATOPATHOLOGY | Facility: CLINIC | Age: 55
End: 2025-08-29
Payer: COMMERCIAL

## 2025-08-29 DIAGNOSIS — D48.5 NEOPLASM OF UNCERTAIN BEHAVIOR OF SKIN: ICD-10-CM

## 2025-09-02 ENCOUNTER — PHARMACY VISIT (OUTPATIENT)
Dept: PHARMACY | Facility: CLINIC | Age: 55
End: 2025-09-02
Payer: COMMERCIAL

## 2025-09-02 DIAGNOSIS — B00.1 RECURRENT COLD SORES: ICD-10-CM

## 2025-09-02 LAB
LABORATORY COMMENT REPORT: NORMAL
PATH REPORT.FINAL DX SPEC: NORMAL
PATH REPORT.GROSS SPEC: NORMAL
PATH REPORT.MICROSCOPIC SPEC OTHER STN: NORMAL
PATH REPORT.RELEVANT HX SPEC: NORMAL
PATH REPORT.TOTAL CANCER: NORMAL

## 2025-09-02 PROCEDURE — RXMED WILLOW AMBULATORY MEDICATION CHARGE

## 2025-09-02 PROCEDURE — RXOTC WILLOW AMBULATORY OTC CHARGE

## 2025-09-02 RX ORDER — VALACYCLOVIR HYDROCHLORIDE 1 G/1
1000 TABLET, FILM COATED ORAL EVERY 24 HOURS
Qty: 90 TABLET | Refills: 2 | Status: SHIPPED | OUTPATIENT
Start: 2025-09-02